# Patient Record
Sex: FEMALE | Race: WHITE | NOT HISPANIC OR LATINO | Employment: OTHER | URBAN - METROPOLITAN AREA
[De-identification: names, ages, dates, MRNs, and addresses within clinical notes are randomized per-mention and may not be internally consistent; named-entity substitution may affect disease eponyms.]

---

## 2021-06-10 ENCOUNTER — OFFICE VISIT (OUTPATIENT)
Dept: GYNECOLOGIC ONCOLOGY | Facility: CLINIC | Age: 61
End: 2021-06-10

## 2021-06-10 ENCOUNTER — PATIENT EDUCATION (SURGERY INSTRUCTIONS) (OUTPATIENT)
Dept: GYNECOLOGIC ONCOLOGY | Facility: CLINIC | Age: 61
End: 2021-06-10

## 2021-06-10 VITALS
DIASTOLIC BLOOD PRESSURE: 69 MMHG | HEART RATE: 95 BPM | BODY MASS INDEX: 50.02 KG/M2 | RESPIRATION RATE: 20 BRPM | HEIGHT: 64 IN | TEMPERATURE: 97.8 F | OXYGEN SATURATION: 95 % | SYSTOLIC BLOOD PRESSURE: 141 MMHG | WEIGHT: 293 LBS

## 2021-06-10 DIAGNOSIS — N85.02 COMPLEX ATYPICAL ENDOMETRIAL HYPERPLASIA: Primary | ICD-10-CM

## 2021-06-10 PROCEDURE — 99205 OFFICE O/P NEW HI 60 MIN: CPT | Performed by: OBSTETRICS & GYNECOLOGY

## 2021-06-10 RX ORDER — HYDROCHLOROTHIAZIDE 25 MG/1
25 TABLET ORAL DAILY
COMMUNITY

## 2021-06-10 RX ORDER — ACETAMINOPHEN 325 MG/1
975 TABLET ORAL ONCE
Status: CANCELLED | OUTPATIENT
Start: 2021-06-10 | End: 2021-06-10

## 2021-06-10 RX ORDER — HEPARIN SODIUM 5000 [USP'U]/ML
5000 INJECTION, SOLUTION INTRAVENOUS; SUBCUTANEOUS ONCE
Status: CANCELLED | OUTPATIENT
Start: 2021-06-10 | End: 2021-06-10

## 2021-06-10 RX ORDER — LEVOTHYROXINE SODIUM 175 UG/1
175 TABLET ORAL EVERY MORNING
COMMUNITY

## 2021-06-10 RX ORDER — IBUPROFEN 200 MG
400 TABLET ORAL EVERY 6 HOURS PRN
COMMUNITY

## 2021-06-10 RX ORDER — FEXOFENADINE HCL 180 MG/1
180 TABLET ORAL AS NEEDED
COMMUNITY

## 2021-06-10 NOTE — PATIENT INSTRUCTIONS
Laparoscopic Surgery Instructions            Cristino Trevino  [id number]  1960      SURGEON:  Ama Morris MD    Surgery Coordinator: Candelaria   If you have any questions before or after surgery please call.  [phone]       Appointment      2. You have pre-admission testing (PAT) appointment on 06/16/2021 at 01:45 PM You will need to be at hospital registration 10 minutes before that time. The registration department is located in the long hallway between the [facility] and [address] [place] [address]. This is on the first floor of the [facility] hospital you can enter through the [address] [place] [address].      3.  Your surgery has been scheduled on 06/18/2021.  You will need to be at the [address] [place] [address] second floor surgery registration on that day at 08:00 AM    The Day(s) Before Surgery     ·  Nothing by mouth after midnight on 06/17/2021    · Plan to have someone take you home from the hospital.    · Do not use any products that contain nicotine or tobacco, such as cigarettes and e-cigarettes. These can delay healing after surgery. If you need help quitting, ask your health care provider.    ·  Do not take vitamins or aspirin one week before surgery ( if applicable).  On the morning of your surgery, you may take you prescription medications with a sip of water, unless told otherwise by your provider.  Bring them with you to the hospital (Diabetic patient should bring insulin if instructed by the managing physician). If you are taking a blood thinner ( Eliquis, Coumadin, Xarelto, Lovenox, Asprin, Heparin, etc.) please have the provider that manages this instruct you on when to stop taking prior to surgery.     · If you are feeling sick, have a fever or cough and have seen your PCP let our office know 48 hours prior to surgery. It may be subject to rescheduling.            What can I expect after the procedure?    A normal length of stay for laparoscopic procedures can be same day or up to 23 hours.     After the procedure, it is  common to have:  · Pain.  · Soreness and numbness in your incision areas.  · Vaginal bleeding and discharge up to 6 weeks after surgery.  · Constipation.  · Temporary change in bladder function.  · Feelings of sadness or other emotions.  · Small amounts of clear drainage from incisions  · If you are discharged with an abdominal binder, this is to be used as needed for incisional comfort. You may choose to discontinue use at any time.      Follow these instructions at home:  Medicines    · Please take any medications that have been prescribed after your surgery, you may take over the counter Tylenol and Ibuprofen, unless told otherwise by your provider.   · Please take your stool softener as prescribed. If you do not have a bowel movement within 24 hours following surgery try a laxative (milk of magnesia) or you may take Laurle-Lax.    Activity    · Return to your normal activities as told by your health care provider.   · You may be told to take short walks every day and go farther each time.  · Do not lift anything that is heavier than 20 lbs.      General instructions    · Do not put anything in your vagina for 6 weeks after your surgery or as told by your health care provider. This includes tampons and douches.  · Do not have sex until your health care provider says you can.  · Do not take baths, swim, or use a hot tub until your health care provider approves.  · Drink enough fluid to keep your urine clear or pale yellow.  · Do not drive for 24 hours if you were given a sedative.  · Do not drive while taking Narcotic Pain medication ( oxycodone, hydrocodone, etc.).   · Keep all follow-up visits as told by your health care provider. This is important. You will have a post op appointment typically 3 weeks after surgery.  · Make sure you are urinating on a scheduled basis, for example every 2 hours. This will help retrain your bladder after surgery and prevent urinary tract infections.     Contact a health care  provider if:    · Your pain medicine is not helping.  · You have a fever over 101.0  · You have redness, swelling, or pain at your incision site.  · You continue to have difficulty urinating.  · You have not had a bowel movement 2-3 days after surgery, experience nausea and vomiting, are unable to pass gas.   · Large volumes of drainage or blood from incisions, requiring multiple guaze changes.     Get help right away if:    · You have severe abdominal or back pain that is not controlled with medication.  · You have heavy bleeding from your vagina that saturates a maxi pad within 1-2 hours. Note- vaginal bleeding and spotting is normal up to 6 weeks from a hysterectomy, Heavy Bleeding is not.     If you experience a medical emergency call 911 or have someone drive you to your nearest emergency department.

## 2021-06-10 NOTE — H&P (VIEW-ONLY)
New Patient Office Visit      Patient Name: Cristino Trevino  : 1960   MRN: [id number]     Referring Physician: Timo Whalen MD    Chief Complaint:    Chief Complaint   Patient presents with   • Appointment     Complex atypical hyperplasia       History of Present Illness: Cristino Trevino is a 61 y.o. female who is here today to as a consultation with gynecologic oncology. She started having vaginal bleeding in March. Bleeding was like a heavy period with passage of tissue. She saw Dr. Whalen who did a D&C and biopsy on Monday. This returned as complex atypia hyperplasia. She was referred here for management. She does have some light spotting since her D&C. Also occasionally has associated cramping. She presents to discuss management options.    Oncologic History:  Oncology/Hematology History   Complex atypical endometrial hyperplasia   6/10/2021 Initial Diagnosis    Referred by Dr. Timo Whalen    Presenting with several month history of postmenopausal bleeding    2021: D&C and hysteroscopy with pathology demonstrating at least CAH with a focus approaching grade 1 endometrial cancer          Past Medical History:   Past Medical History:   Diagnosis Date   • Chiari I malformation (CMS/HCC)    • Disease of thyroid gland    • Fatty liver disease, nonalcoholic    • Fibromyalgia    • Fluid retention    • Hashimoto's disease    • Intestinal malabsorption    • Lupus (CMS/HCC)    • Israel syndrome (CMS/HCC)    • Sjogren's disease (CMS/HCC)    • Thyroid cancer (CMS/HCC) 2006    Thyroidectomy & Radiation in [place]       Past Surgical History:   Past Surgical History:   Procedure Laterality Date   • COLONOSCOPY     • RETINAL DETACHMENT REPAIR     • SECONDARY INTRAOCULAR LENSE IMPLANTATION     • THYROIDECTOMY  2006   • WISDOM TOOTH EXTRACTION      age 18       Family History:   Family History   Problem Relation Age of Onset   • Heart disease Father    • Hypertension Mother    • Breast cancer  Maternal Aunt    • Thyroid cancer Paternal Aunt        Social History:   Social History     Socioeconomic History   • Marital status: Single     Spouse name: Not on file   • Number of children: Not on file   • Years of education: Not on file   • Highest education level: Not on file   Tobacco Use   • Smoking status: Never Smoker   • Smokeless tobacco: Never Used   Vaping Use   • Vaping Use: Never used   Substance and Sexual Activity   • Alcohol use: Yes     Comment: Rarely   • Drug use: Not Currently     Types: Marijuana     Comment: Medical Marijuana prn   • Sexual activity: Not Currently       Past OB/GYN History:   OB History   No obstetric history on file.   G0  Menopause at 49. She did do the estrogen patch right after menopause for 3-4 years.  Denies any abnormal pap tests.     Health Maintenance:   Mammogram: Date: 2019 Results: normal  Colonoscopy: Date: 5 years Results: normal     Medications:     Current Outpatient Medications:   •  fexofenadine (ALLEGRA) 180 MG tablet, Take 180 mg by mouth Daily., Disp: , Rfl:   •  hydroCHLOROthiazide (HYDRODIURIL) 25 MG tablet, Take 25 mg by mouth Daily., Disp: , Rfl:   •  ibuprofen (ADVIL,MOTRIN) 200 MG tablet, Take 200 mg by mouth Every 6 (Six) Hours As Needed for Mild Pain ., Disp: , Rfl:   •  levothyroxine (SYNTHROID, LEVOTHROID) 175 MCG tablet, Take 175 mcg by mouth Daily., Disp: , Rfl:     Allergies:   No Known Allergies    Review of Systems:   Review of Systems   Constitutional: Positive for fatigue. Negative for activity change, appetite change, chills and unexpected weight change.   Eyes: Positive for itching and visual disturbance.   Respiratory: Positive for shortness of breath (Occasionally). Negative for cough and wheezing.    Cardiovascular: Negative for chest pain, palpitations and leg swelling.   Gastrointestinal: Negative for abdominal distention, abdominal pain, constipation, diarrhea, nausea and vomiting.   Genitourinary: Positive for pelvic pain and  "vaginal bleeding. Negative for dyspareunia, dysuria, flank pain, frequency and urgency.   Musculoskeletal: Positive for arthralgias, back pain, joint swelling and myalgias.   Skin: Negative for color change, pallor, rash and wound.   Neurological: Positive for weakness and numbness. Negative for dizziness, light-headedness and headaches.   Hematological: Negative for adenopathy. Does not bruise/bleed easily.   Psychiatric/Behavioral: Negative for dysphoric mood. The patient is not nervous/anxious.         Objective     Physical Exam:  Vital Signs:   Vitals:    06/10/21 1420   BP: 141/69  Comment: Left Wrist   Pulse: 95   Resp: 20   Temp: 97.8 °F (36.6 °C)   TempSrc: Infrared   SpO2: 95%  Comment: RA   Weight: (!) 138 kg (305 lb)   Height: 162.6 cm (64\")   PainSc: 0-No pain     BMI: Body mass index is 52.35 kg/m².   ECOG score: 2           PHQ-2 Depression Screening  Little interest or pleasure in doing things? 0   Feeling down, depressed, or hopeless? 0   PHQ-2 Total Score 0     Physical Exam  Vitals and nursing note reviewed. Exam conducted with a chaperone present.   Constitutional:       General: She is not in acute distress.     Appearance: Normal appearance. She is well-developed. She is obese. She is not diaphoretic.   HENT:      Head: Normocephalic and atraumatic.      Right Ear: External ear normal.      Left Ear: External ear normal.      Nose: Nose normal.   Eyes:      General: No scleral icterus.        Right eye: No discharge.         Left eye: No discharge.      Conjunctiva/sclera: Conjunctivae normal.   Neck:      Thyroid: No thyromegaly.   Cardiovascular:      Rate and Rhythm: Normal rate and regular rhythm.      Heart sounds: No murmur heard.     Pulmonary:      Effort: Pulmonary effort is normal. No respiratory distress.      Breath sounds: Normal breath sounds. No wheezing.   Abdominal:      General: Bowel sounds are normal. There is no distension.      Palpations: Abdomen is soft. There is no " mass.      Tenderness: There is no abdominal tenderness. There is no guarding.      Hernia: No hernia is present.   Genitourinary:     Comments: External genitalia normal. Vagina without discharge. Cervix normal. Uterus normal and mobile. No adnexal masses. Exam limited by habitus. Rectovaginal exam deferred.  Musculoskeletal:         General: No swelling, tenderness, deformity or signs of injury.      Cervical back: Normal range of motion and neck supple.      Right lower leg: No edema.      Left lower leg: No edema.   Lymphadenopathy:      Cervical: No cervical adenopathy.   Skin:     General: Skin is warm and dry.      Findings: No erythema, lesion or rash.   Neurological:      General: No focal deficit present.      Mental Status: She is alert and oriented to person, place, and time. Mental status is at baseline.   Psychiatric:         Behavior: Behavior normal.         Thought Content: Thought content normal.         Assessment / Plan    Cristino Trevino is a 61 y.o. year old female recently diagnosed with complex atypical hyperplasia of the endometrium. I have counseled her that the risk of concomitant endometrial cancer exceeds 40% and that surgery is the cornerstone of management. I have recommended that the patient undergo a robotic assisted total laparoscopic hysterectomy with bilateral salpingo-oophorectomy. Given the increased risk of concurrent cancer, I recommended lymph node assessment via the sentinel node approach +/- pelvic/para-aortic lymph node dissection pending operative findings.  We discussed the importance of awaiting final pathology and also discussed the nature of endometrial cancer. She is aware that the majority of endometrial cancer cases are found in the early stages; however, if more advanced disease is encountered, she may require chemotherapy, radiation or a combination of the two. She was also informed of the risks of the procedure including bleeding, infection, wound breakdown, blood  clots, injury to surrounding organs requiring additional intervention, possible need for another procedure, and the need for a laparotomy if the surgery cannot be performed safely via the minimally invasive approach. We also discussed the anticipated hospital stay and recovery time. After all questions were answered, she did sign the informed consent and will be scheduled for surgery in the near future.      Morbid obesity (current Body mass index is 52.35 kg/m².): I counseled her that excess body weight is associated with the development of many health issues (including cancer and heart disease) and increases the risk of surgical complications. She was reminded the importance of a healthy body weight. Diet and exercise guidelines ([employer]) were reviewed.     Pain assessment was performed today as a part of patient’s care.  For patients with pain related to surgery, gynecologic malignancy or cancer treatment, the plan is as noted in the assessment/plan.  For patients with pain not related to these issues, they are to seek any further needed care from a more appropriate provider, such as PCP.      Diagnoses and all orders for this visit:    1. Complex atypical endometrial hyperplasia (Primary)  -     CBC & Differential; Future  -     Comprehensive Metabolic Panel; Future  -     XR Chest 2 View; Future  -     Case Request; Standing  -     ECG 12 Lead; Future  -     Type & Screen; Future  -     Case Request    Other orders  -     Verify NPO Status; Future  -     Obtain Informed Consent; Future  -     Provide Instructions to Patient on NPO Status; Future  -     Chlorhexidine Skin Prep; Future  -     Instruct Patient on Coughing, Deep Breathing & Incentive Spirometry; Future  -     Verify NPO Status; Standing  -     Obtain Informed Consent If Not Already Obtained; Standing  -     Notify Physician - Standard; Standing  -     Initiate Observation Status; Standing  -     acetaminophen (TYLENOL) tablet 975  mg  -     heparin (porcine) 5000 UNIT/ML injection 5,000 Units  -     ceFAZolin (ANCEF) 2 g in sodium chloride 0.9 % 100 mL IVPB         Follow Up: Surgery.    RONY Morris MD  Gynecologic Oncology     Please note that portions of this note may have been completed with a voice recognition program.

## 2021-06-10 NOTE — PROGRESS NOTES
New Patient Office Visit      Patient Name: Cristino Trevino  : 1960   MRN: [id number]     Referring Physician: Timo Whalen MD    Chief Complaint:    Chief Complaint   Patient presents with   • Appointment     Complex atypical hyperplasia       History of Present Illness: Cristino Trevino is a 61 y.o. female who is here today to as a consultation with gynecologic oncology. She started having vaginal bleeding in March. Bleeding was like a heavy period with passage of tissue. She saw Dr. Whalen who did a D&C and biopsy on Monday. This returned as complex atypia hyperplasia. She was referred here for management. She does have some light spotting since her D&C. Also occasionally has associated cramping. She presents to discuss management options.    Oncologic History:  Oncology/Hematology History   Complex atypical endometrial hyperplasia   6/10/2021 Initial Diagnosis    Referred by Dr. Timo Whalen    Presenting with several month history of postmenopausal bleeding    2021: D&C and hysteroscopy with pathology demonstrating at least CAH with a focus approaching grade 1 endometrial cancer          Past Medical History:   Past Medical History:   Diagnosis Date   • Chiari I malformation (CMS/HCC)    • Disease of thyroid gland    • Fatty liver disease, nonalcoholic    • Fibromyalgia    • Fluid retention    • Hashimoto's disease    • Intestinal malabsorption    • Lupus (CMS/HCC)    • Israel syndrome (CMS/HCC)    • Sjogren's disease (CMS/HCC)    • Thyroid cancer (CMS/HCC) 2006    Thyroidectomy & Radiation in [place]       Past Surgical History:   Past Surgical History:   Procedure Laterality Date   • COLONOSCOPY     • RETINAL DETACHMENT REPAIR     • SECONDARY INTRAOCULAR LENSE IMPLANTATION     • THYROIDECTOMY  2006   • WISDOM TOOTH EXTRACTION      age 18       Family History:   Family History   Problem Relation Age of Onset   • Heart disease Father    • Hypertension Mother    • Breast cancer  Maternal Aunt    • Thyroid cancer Paternal Aunt        Social History:   Social History     Socioeconomic History   • Marital status: Single     Spouse name: Not on file   • Number of children: Not on file   • Years of education: Not on file   • Highest education level: Not on file   Tobacco Use   • Smoking status: Never Smoker   • Smokeless tobacco: Never Used   Vaping Use   • Vaping Use: Never used   Substance and Sexual Activity   • Alcohol use: Yes     Comment: Rarely   • Drug use: Not Currently     Types: Marijuana     Comment: Medical Marijuana prn   • Sexual activity: Not Currently       Past OB/GYN History:   OB History   No obstetric history on file.   G0  Menopause at 49. She did do the estrogen patch right after menopause for 3-4 years.  Denies any abnormal pap tests.     Health Maintenance:   Mammogram: Date: 2019 Results: normal  Colonoscopy: Date: 5 years Results: normal     Medications:     Current Outpatient Medications:   •  fexofenadine (ALLEGRA) 180 MG tablet, Take 180 mg by mouth Daily., Disp: , Rfl:   •  hydroCHLOROthiazide (HYDRODIURIL) 25 MG tablet, Take 25 mg by mouth Daily., Disp: , Rfl:   •  ibuprofen (ADVIL,MOTRIN) 200 MG tablet, Take 200 mg by mouth Every 6 (Six) Hours As Needed for Mild Pain ., Disp: , Rfl:   •  levothyroxine (SYNTHROID, LEVOTHROID) 175 MCG tablet, Take 175 mcg by mouth Daily., Disp: , Rfl:     Allergies:   No Known Allergies    Review of Systems:   Review of Systems   Constitutional: Positive for fatigue. Negative for activity change, appetite change, chills and unexpected weight change.   Eyes: Positive for itching and visual disturbance.   Respiratory: Positive for shortness of breath (Occasionally). Negative for cough and wheezing.    Cardiovascular: Negative for chest pain, palpitations and leg swelling.   Gastrointestinal: Negative for abdominal distention, abdominal pain, constipation, diarrhea, nausea and vomiting.   Genitourinary: Positive for pelvic pain and  "vaginal bleeding. Negative for dyspareunia, dysuria, flank pain, frequency and urgency.   Musculoskeletal: Positive for arthralgias, back pain, joint swelling and myalgias.   Skin: Negative for color change, pallor, rash and wound.   Neurological: Positive for weakness and numbness. Negative for dizziness, light-headedness and headaches.   Hematological: Negative for adenopathy. Does not bruise/bleed easily.   Psychiatric/Behavioral: Negative for dysphoric mood. The patient is not nervous/anxious.         Objective     Physical Exam:  Vital Signs:   Vitals:    06/10/21 1420   BP: 141/69  Comment: Left Wrist   Pulse: 95   Resp: 20   Temp: 97.8 °F (36.6 °C)   TempSrc: Infrared   SpO2: 95%  Comment: RA   Weight: (!) 138 kg (305 lb)   Height: 162.6 cm (64\")   PainSc: 0-No pain     BMI: Body mass index is 52.35 kg/m².   ECOG score: 2           PHQ-2 Depression Screening  Little interest or pleasure in doing things? 0   Feeling down, depressed, or hopeless? 0   PHQ-2 Total Score 0     Physical Exam  Vitals and nursing note reviewed. Exam conducted with a chaperone present.   Constitutional:       General: She is not in acute distress.     Appearance: Normal appearance. She is well-developed. She is obese. She is not diaphoretic.   HENT:      Head: Normocephalic and atraumatic.      Right Ear: External ear normal.      Left Ear: External ear normal.      Nose: Nose normal.   Eyes:      General: No scleral icterus.        Right eye: No discharge.         Left eye: No discharge.      Conjunctiva/sclera: Conjunctivae normal.   Neck:      Thyroid: No thyromegaly.   Cardiovascular:      Rate and Rhythm: Normal rate and regular rhythm.      Heart sounds: No murmur heard.     Pulmonary:      Effort: Pulmonary effort is normal. No respiratory distress.      Breath sounds: Normal breath sounds. No wheezing.   Abdominal:      General: Bowel sounds are normal. There is no distension.      Palpations: Abdomen is soft. There is no " mass.      Tenderness: There is no abdominal tenderness. There is no guarding.      Hernia: No hernia is present.   Genitourinary:     Comments: External genitalia normal. Vagina without discharge. Cervix normal. Uterus normal and mobile. No adnexal masses. Exam limited by habitus. Rectovaginal exam deferred.  Musculoskeletal:         General: No swelling, tenderness, deformity or signs of injury.      Cervical back: Normal range of motion and neck supple.      Right lower leg: No edema.      Left lower leg: No edema.   Lymphadenopathy:      Cervical: No cervical adenopathy.   Skin:     General: Skin is warm and dry.      Findings: No erythema, lesion or rash.   Neurological:      General: No focal deficit present.      Mental Status: She is alert and oriented to person, place, and time. Mental status is at baseline.   Psychiatric:         Behavior: Behavior normal.         Thought Content: Thought content normal.         Assessment / Plan    Cristino Trevino is a 61 y.o. year old female recently diagnosed with complex atypical hyperplasia of the endometrium. I have counseled her that the risk of concomitant endometrial cancer exceeds 40% and that surgery is the cornerstone of management. I have recommended that the patient undergo a robotic assisted total laparoscopic hysterectomy with bilateral salpingo-oophorectomy. Given the increased risk of concurrent cancer, I recommended lymph node assessment via the sentinel node approach +/- pelvic/para-aortic lymph node dissection pending operative findings.  We discussed the importance of awaiting final pathology and also discussed the nature of endometrial cancer. She is aware that the majority of endometrial cancer cases are found in the early stages; however, if more advanced disease is encountered, she may require chemotherapy, radiation or a combination of the two. She was also informed of the risks of the procedure including bleeding, infection, wound breakdown, blood  clots, injury to surrounding organs requiring additional intervention, possible need for another procedure, and the need for a laparotomy if the surgery cannot be performed safely via the minimally invasive approach. We also discussed the anticipated hospital stay and recovery time. After all questions were answered, she did sign the informed consent and will be scheduled for surgery in the near future.      Morbid obesity (current Body mass index is 52.35 kg/m².): I counseled her that excess body weight is associated with the development of many health issues (including cancer and heart disease) and increases the risk of surgical complications. She was reminded the importance of a healthy body weight. Diet and exercise guidelines (American [employer] Society) were reviewed.     Pain assessment was performed today as a part of patient’s care.  For patients with pain related to surgery, gynecologic malignancy or cancer treatment, the plan is as noted in the assessment/plan.  For patients with pain not related to these issues, they are to seek any further needed care from a more appropriate provider, such as PCP.      Diagnoses and all orders for this visit:    1. Complex atypical endometrial hyperplasia (Primary)  -     CBC & Differential; Future  -     Comprehensive Metabolic Panel; Future  -     XR Chest 2 View; Future  -     Case Request; Standing  -     ECG 12 Lead; Future  -     Type & Screen; Future  -     Case Request    Other orders  -     Verify NPO Status; Future  -     Obtain Informed Consent; Future  -     Provide Instructions to Patient on NPO Status; Future  -     Chlorhexidine Skin Prep; Future  -     Instruct Patient on Coughing, Deep Breathing & Incentive Spirometry; Future  -     Verify NPO Status; Standing  -     Obtain Informed Consent If Not Already Obtained; Standing  -     Notify Physician - Standard; Standing  -     Initiate Observation Status; Standing  -     acetaminophen (TYLENOL) tablet 975  mg  -     heparin (porcine) 5000 UNIT/ML injection 5,000 Units  -     ceFAZolin (ANCEF) 2 g in sodium chloride 0.9 % 100 mL IVPB         Follow Up: Surgery.    RONY Morris MD  Gynecologic Oncology     Please note that portions of this note may have been completed with a voice recognition program.

## 2021-06-16 ENCOUNTER — PRE-ADMISSION TESTING (OUTPATIENT)
Dept: PREADMISSION TESTING | Facility: HOSPITAL | Age: 61
End: 2021-06-16

## 2021-06-16 ENCOUNTER — ANESTHESIA EVENT (OUTPATIENT)
Dept: PERIOP | Facility: HOSPITAL | Age: 61
End: 2021-06-16

## 2021-06-16 ENCOUNTER — HOSPITAL ENCOUNTER (OUTPATIENT)
Dept: GENERAL RADIOLOGY | Facility: HOSPITAL | Age: 61
Discharge: HOME | End: 2021-06-16

## 2021-06-16 VITALS — HEIGHT: 64 IN | WEIGHT: 293 LBS | BODY MASS INDEX: 50.02 KG/M2

## 2021-06-16 DIAGNOSIS — N85.02 COMPLEX ATYPICAL ENDOMETRIAL HYPERPLASIA: ICD-10-CM

## 2021-06-16 LAB
ABO GROUP BLD: NORMAL
ALBUMIN SERPL-MCNC: 4.1 G/DL (ref 3.5–5.2)
ALBUMIN/GLOB SERPL: 1.5 G/DL
ALP SERPL-CCNC: 91 U/L (ref 39–117)
ALT SERPL W P-5'-P-CCNC: 112 U/L (ref 1–33)
ANION GAP SERPL CALCULATED.3IONS-SCNC: 11 MMOL/L (ref 5–15)
AST SERPL-CCNC: 102 U/L (ref 1–32)
BASOPHILS # BLD AUTO: 0.06 10*3/MM3 (ref 0–0.2)
BASOPHILS NFR BLD AUTO: 0.5 % (ref 0–1.5)
BILIRUB SERPL-MCNC: 1.1 MG/DL (ref 0–1.2)
BLD GP AB SCN SERPL QL: NEGATIVE
BUN SERPL-MCNC: 16 MG/DL (ref 8–23)
BUN/CREAT SERPL: 25 (ref 7–25)
CALCIUM SPEC-SCNC: 9.5 MG/DL (ref 8.6–10.5)
CHLORIDE SERPL-SCNC: 100 MMOL/L (ref 98–107)
CO2 SERPL-SCNC: 27 MMOL/L (ref 22–29)
CREAT SERPL-MCNC: 0.64 MG/DL (ref 0.57–1)
DEPRECATED RDW RBC AUTO: 47 FL (ref 37–54)
EOSINOPHIL # BLD AUTO: 0.38 10*3/MM3 (ref 0–0.4)
EOSINOPHIL NFR BLD AUTO: 3.1 % (ref 0.3–6.2)
ERYTHROCYTE [DISTWIDTH] IN BLOOD BY AUTOMATED COUNT: 14.8 % (ref 12.3–15.4)
GFR SERPL CREATININE-BSD FRML MDRD: 94 ML/MIN/1.73
GLOBULIN UR ELPH-MCNC: 2.7 GM/DL
GLUCOSE SERPL-MCNC: 177 MG/DL (ref 65–99)
HCT VFR BLD AUTO: 45.9 % (ref 34–46.6)
HGB BLD-MCNC: 13.6 G/DL (ref 12–15.9)
IMM GRANULOCYTES # BLD AUTO: 0.05 10*3/MM3 (ref 0–0.05)
IMM GRANULOCYTES NFR BLD AUTO: 0.4 % (ref 0–0.5)
LYMPHOCYTES # BLD AUTO: 2.5 10*3/MM3 (ref 0.7–3.1)
LYMPHOCYTES NFR BLD AUTO: 20.3 % (ref 19.6–45.3)
MCH RBC QN AUTO: 25.7 PG (ref 26.6–33)
MCHC RBC AUTO-ENTMCNC: 29.6 G/DL (ref 31.5–35.7)
MCV RBC AUTO: 86.6 FL (ref 79–97)
MONOCYTES # BLD AUTO: 0.74 10*3/MM3 (ref 0.1–0.9)
MONOCYTES NFR BLD AUTO: 6 % (ref 5–12)
NEUTROPHILS NFR BLD AUTO: 69.7 % (ref 42.7–76)
NEUTROPHILS NFR BLD AUTO: 8.61 10*3/MM3 (ref 1.7–7)
NRBC BLD AUTO-RTO: 0 /100 WBC (ref 0–0.2)
PLATELET # BLD AUTO: 297 10*3/MM3 (ref 140–450)
PMV BLD AUTO: 10.7 FL (ref 6–12)
POTASSIUM SERPL-SCNC: 3.8 MMOL/L (ref 3.5–5.2)
PROT SERPL-MCNC: 6.8 G/DL (ref 6–8.5)
QT INTERVAL: 386 MS
QTC INTERVAL: 469 MS
RBC # BLD AUTO: 5.3 10*6/MM3 (ref 3.77–5.28)
RH BLD: POSITIVE
SARS-COV-2 RNA PNL SPEC NAA+PROBE: NOT DETECTED
SODIUM SERPL-SCNC: 138 MMOL/L (ref 136–145)
T&S EXPIRATION DATE: NORMAL
WBC # BLD AUTO: 12.34 10*3/MM3 (ref 3.4–10.8)

## 2021-06-16 PROCEDURE — 93010 ELECTROCARDIOGRAM REPORT: CPT | Performed by: INTERNAL MEDICINE

## 2021-06-16 PROCEDURE — 86900 BLOOD TYPING SEROLOGIC ABO: CPT

## 2021-06-16 PROCEDURE — 86901 BLOOD TYPING SEROLOGIC RH(D): CPT

## 2021-06-16 PROCEDURE — 36415 COLL VENOUS BLD VENIPUNCTURE: CPT

## 2021-06-16 PROCEDURE — U0004 COV-19 TEST NON-CDC HGH THRU: HCPCS

## 2021-06-16 PROCEDURE — 86850 RBC ANTIBODY SCREEN: CPT

## 2021-06-16 PROCEDURE — 71046 X-RAY EXAM CHEST 2 VIEWS: CPT

## 2021-06-16 PROCEDURE — C9803 HOPD COVID-19 SPEC COLLECT: HCPCS

## 2021-06-16 PROCEDURE — 85025 COMPLETE CBC W/AUTO DIFF WBC: CPT

## 2021-06-16 PROCEDURE — 93005 ELECTROCARDIOGRAM TRACING: CPT

## 2021-06-16 PROCEDURE — 80053 COMPREHEN METABOLIC PANEL: CPT

## 2021-06-16 NOTE — PAT
Patient to apply Chlorhexadine wipes  to surgical area (as instructed) the night before procedure and the AM of procedure. Wipes provided.    An arrival time for procedure was not given during PAT visit. If patient had any questions or concerns about their arrival time, they were instructed to contact their surgeon/physician.  Additionally, if the patient referred to an arrival time that was acquired from their my chart account, patient was encouraged to verify that time with their surgeon/physician.  NO arrival times given in Pre Admission Testing Department.    Patient directed to Radiology Department for CXR after Pre Admission Testing Appointment.     covid in pat     EKG faxed to anesthesia.  EKG reviewed by Dr Wu.  No further testing needed at this time.

## 2021-06-17 ENCOUNTER — TELEPHONE (OUTPATIENT)
Dept: GYNECOLOGIC ONCOLOGY | Facility: CLINIC | Age: 61
End: 2021-06-17

## 2021-06-17 NOTE — TELEPHONE ENCOUNTER
Caller: [employer]    Relationship: PATIENT    Best call back number: [phone]     What is the best time to reach you: ASAP    Who are you requesting to speak with (clinical staff, provider,  specific staff member): NUVIA    What was the call regarding: SHE JUST GOT A CALL FROM THE HOSPITAL STATING HER INSURANCE HAS NOT AUTHORIZED HER SURGERY FOR TOMORROW MORNING YET.     Do you require a callback: YES

## 2021-06-18 ENCOUNTER — HOSPITAL ENCOUNTER (OUTPATIENT)
Facility: HOSPITAL | Age: 61
Setting detail: HOSPITAL OUTPATIENT SURGERY
Discharge: HOME | End: 2021-06-18
Attending: OBSTETRICS & GYNECOLOGY | Admitting: OBSTETRICS & GYNECOLOGY

## 2021-06-18 ENCOUNTER — ANESTHESIA (OUTPATIENT)
Dept: PERIOP | Facility: HOSPITAL | Age: 61
End: 2021-06-18

## 2021-06-18 VITALS
WEIGHT: 293 LBS | HEIGHT: 66 IN | RESPIRATION RATE: 20 BRPM | TEMPERATURE: 97.9 F | DIASTOLIC BLOOD PRESSURE: 97 MMHG | HEART RATE: 70 BPM | BODY MASS INDEX: 47.09 KG/M2 | OXYGEN SATURATION: 93 % | SYSTOLIC BLOOD PRESSURE: 167 MMHG

## 2021-06-18 DIAGNOSIS — N85.02 COMPLEX ATYPICAL ENDOMETRIAL HYPERPLASIA: ICD-10-CM

## 2021-06-18 LAB
ABO GROUP BLD: NORMAL
RH BLD: POSITIVE

## 2021-06-18 PROCEDURE — 58571 TLH W/T/O 250 G OR LESS: CPT | Performed by: PHYSICIAN ASSISTANT

## 2021-06-18 PROCEDURE — 38571 LAPAROSCOPY LYMPHADENECTOMY: CPT | Performed by: PHYSICIAN ASSISTANT

## 2021-06-18 PROCEDURE — 25010000002 HEPARIN (PORCINE) PER 1000 UNITS: Performed by: OBSTETRICS & GYNECOLOGY

## 2021-06-18 PROCEDURE — 25010000002 FENTANYL CITRATE (PF) 50 MCG/ML SOLUTION: Performed by: ANESTHESIOLOGY

## 2021-06-18 PROCEDURE — 58571 TLH W/T/O 250 G OR LESS: CPT | Performed by: OBSTETRICS & GYNECOLOGY

## 2021-06-18 PROCEDURE — 25010000002 PROPOFOL 10 MG/ML EMULSION: Performed by: NURSE ANESTHETIST, CERTIFIED REGISTERED

## 2021-06-18 PROCEDURE — 88307 TISSUE EXAM BY PATHOLOGIST: CPT | Performed by: OBSTETRICS & GYNECOLOGY

## 2021-06-18 PROCEDURE — 25010000002 FENTANYL CITRATE (PF) 50 MCG/ML SOLUTION: Performed by: NURSE ANESTHETIST, CERTIFIED REGISTERED

## 2021-06-18 PROCEDURE — 38571 LAPAROSCOPY LYMPHADENECTOMY: CPT | Performed by: OBSTETRICS & GYNECOLOGY

## 2021-06-18 PROCEDURE — 25010000003 CEFAZOLIN IN DEXTROSE 2-4 GM/100ML-% SOLUTION: Performed by: OBSTETRICS & GYNECOLOGY

## 2021-06-18 PROCEDURE — 86900 BLOOD TYPING SEROLOGIC ABO: CPT

## 2021-06-18 PROCEDURE — G0378 HOSPITAL OBSERVATION PER HR: HCPCS

## 2021-06-18 PROCEDURE — 25010000002 DEXAMETHASONE PER 1 MG: Performed by: NURSE ANESTHETIST, CERTIFIED REGISTERED

## 2021-06-18 PROCEDURE — 25010000002 HYDROMORPHONE PER 4 MG: Performed by: ANESTHESIOLOGY

## 2021-06-18 PROCEDURE — 38900 IO MAP OF SENT LYMPH NODE: CPT | Performed by: OBSTETRICS & GYNECOLOGY

## 2021-06-18 PROCEDURE — 25010000002 NEOSTIGMINE 10 MG/10ML SOLUTION: Performed by: NURSE ANESTHETIST, CERTIFIED REGISTERED

## 2021-06-18 PROCEDURE — 25010000002 FENTANYL CITRATE (PF) 100 MCG/2ML SOLUTION: Performed by: ANESTHESIOLOGY

## 2021-06-18 PROCEDURE — 86901 BLOOD TYPING SEROLOGIC RH(D): CPT

## 2021-06-18 PROCEDURE — 25010000002 ONDANSETRON PER 1 MG: Performed by: NURSE ANESTHETIST, CERTIFIED REGISTERED

## 2021-06-18 RX ORDER — SODIUM CHLORIDE 9 MG/ML
INJECTION, SOLUTION INTRAVENOUS AS NEEDED
Status: DISCONTINUED | OUTPATIENT
Start: 2021-06-18 | End: 2021-06-18 | Stop reason: HOSPADM

## 2021-06-18 RX ORDER — FAMOTIDINE 20 MG/1
20 TABLET, FILM COATED ORAL
Status: COMPLETED | OUTPATIENT
Start: 2021-06-18 | End: 2021-06-18

## 2021-06-18 RX ORDER — HEPARIN SODIUM 5000 [USP'U]/ML
5000 INJECTION, SOLUTION INTRAVENOUS; SUBCUTANEOUS ONCE
Status: COMPLETED | OUTPATIENT
Start: 2021-06-18 | End: 2021-06-18

## 2021-06-18 RX ORDER — MIDAZOLAM HYDROCHLORIDE 1 MG/ML
1 INJECTION INTRAMUSCULAR; INTRAVENOUS
Status: DISCONTINUED | OUTPATIENT
Start: 2021-06-18 | End: 2021-06-18 | Stop reason: HOSPADM

## 2021-06-18 RX ORDER — FENTANYL CITRATE 50 UG/ML
50 INJECTION, SOLUTION INTRAMUSCULAR; INTRAVENOUS
Status: DISCONTINUED | OUTPATIENT
Start: 2021-06-18 | End: 2021-06-18 | Stop reason: HOSPADM

## 2021-06-18 RX ORDER — DOCUSATE SODIUM 100 MG/1
100 CAPSULE, LIQUID FILLED ORAL 2 TIMES DAILY
Qty: 60 CAPSULE | Refills: 0 | Status: SHIPPED | OUTPATIENT
Start: 2021-06-18 | End: 2021-07-08

## 2021-06-18 RX ORDER — ROCURONIUM BROMIDE 10 MG/ML
INJECTION, SOLUTION INTRAVENOUS AS NEEDED
Status: DISCONTINUED | OUTPATIENT
Start: 2021-06-18 | End: 2021-06-18 | Stop reason: SURG

## 2021-06-18 RX ORDER — ACETAMINOPHEN 325 MG/1
650 TABLET ORAL EVERY 6 HOURS PRN
Qty: 60 TABLET | Refills: 0 | Status: SHIPPED | OUTPATIENT
Start: 2021-06-18

## 2021-06-18 RX ORDER — ACETAMINOPHEN 325 MG/1
975 TABLET ORAL ONCE
Status: COMPLETED | OUTPATIENT
Start: 2021-06-18 | End: 2021-06-18

## 2021-06-18 RX ORDER — FENTANYL CITRATE 50 UG/ML
INJECTION, SOLUTION INTRAMUSCULAR; INTRAVENOUS AS NEEDED
Status: DISCONTINUED | OUTPATIENT
Start: 2021-06-18 | End: 2021-06-18 | Stop reason: SURG

## 2021-06-18 RX ORDER — HYDROMORPHONE HYDROCHLORIDE 1 MG/ML
0.5 INJECTION, SOLUTION INTRAMUSCULAR; INTRAVENOUS; SUBCUTANEOUS
Status: DISCONTINUED | OUTPATIENT
Start: 2021-06-18 | End: 2021-06-18 | Stop reason: HOSPADM

## 2021-06-18 RX ORDER — FENTANYL CITRATE 50 UG/ML
25 INJECTION, SOLUTION INTRAMUSCULAR; INTRAVENOUS ONCE
Status: COMPLETED | OUTPATIENT
Start: 2021-06-18 | End: 2021-06-18

## 2021-06-18 RX ORDER — MAGNESIUM HYDROXIDE 1200 MG/15ML
LIQUID ORAL AS NEEDED
Status: DISCONTINUED | OUTPATIENT
Start: 2021-06-18 | End: 2021-06-18 | Stop reason: HOSPADM

## 2021-06-18 RX ORDER — LIDOCAINE HYDROCHLORIDE 10 MG/ML
INJECTION, SOLUTION EPIDURAL; INFILTRATION; INTRACAUDAL; PERINEURAL AS NEEDED
Status: DISCONTINUED | OUTPATIENT
Start: 2021-06-18 | End: 2021-06-18 | Stop reason: SURG

## 2021-06-18 RX ORDER — OXYCODONE HYDROCHLORIDE 5 MG/1
5 TABLET ORAL EVERY 4 HOURS PRN
Qty: 10 TABLET | Refills: 0 | Status: SHIPPED | OUTPATIENT
Start: 2021-06-18 | End: 2021-07-08

## 2021-06-18 RX ORDER — SODIUM CHLORIDE, SODIUM LACTATE, POTASSIUM CHLORIDE, CALCIUM CHLORIDE 600; 310; 30; 20 MG/100ML; MG/100ML; MG/100ML; MG/100ML
9 INJECTION, SOLUTION INTRAVENOUS CONTINUOUS PRN
Status: DISCONTINUED | OUTPATIENT
Start: 2021-06-18 | End: 2021-06-18 | Stop reason: HOSPADM

## 2021-06-18 RX ORDER — SODIUM CHLORIDE 0.9 % (FLUSH) 0.9 %
10 SYRINGE (ML) INJECTION AS NEEDED
Status: CANCELLED | OUTPATIENT
Start: 2021-06-18

## 2021-06-18 RX ORDER — GLYCOPYRROLATE 0.2 MG/ML
INJECTION INTRAMUSCULAR; INTRAVENOUS AS NEEDED
Status: DISCONTINUED | OUTPATIENT
Start: 2021-06-18 | End: 2021-06-18 | Stop reason: SURG

## 2021-06-18 RX ORDER — CEFAZOLIN SODIUM 2 G/100ML
2 INJECTION, SOLUTION INTRAVENOUS ONCE
Status: COMPLETED | OUTPATIENT
Start: 2021-06-18 | End: 2021-06-18

## 2021-06-18 RX ORDER — ESMOLOL HYDROCHLORIDE 10 MG/ML
INJECTION INTRAVENOUS AS NEEDED
Status: DISCONTINUED | OUTPATIENT
Start: 2021-06-18 | End: 2021-06-18 | Stop reason: SURG

## 2021-06-18 RX ORDER — PROPOFOL 10 MG/ML
VIAL (ML) INTRAVENOUS AS NEEDED
Status: DISCONTINUED | OUTPATIENT
Start: 2021-06-18 | End: 2021-06-18 | Stop reason: SURG

## 2021-06-18 RX ORDER — BUPIVACAINE HYDROCHLORIDE AND EPINEPHRINE 5; 5 MG/ML; UG/ML
INJECTION, SOLUTION PERINEURAL AS NEEDED
Status: DISCONTINUED | OUTPATIENT
Start: 2021-06-18 | End: 2021-06-18 | Stop reason: HOSPADM

## 2021-06-18 RX ORDER — OXYCODONE HYDROCHLORIDE 5 MG/1
5 TABLET ORAL ONCE AS NEEDED
Status: COMPLETED | OUTPATIENT
Start: 2021-06-18 | End: 2021-06-18

## 2021-06-18 RX ORDER — INDOCYANINE GREEN AND WATER 25 MG
KIT INJECTION AS NEEDED
Status: DISCONTINUED | OUTPATIENT
Start: 2021-06-18 | End: 2021-06-18 | Stop reason: HOSPADM

## 2021-06-18 RX ORDER — SODIUM CHLORIDE 0.9 % (FLUSH) 0.9 %
10 SYRINGE (ML) INJECTION EVERY 12 HOURS SCHEDULED
Status: CANCELLED | OUTPATIENT
Start: 2021-06-18

## 2021-06-18 RX ORDER — ALBUTEROL SULFATE 90 UG/1
AEROSOL, METERED RESPIRATORY (INHALATION) AS NEEDED
Status: DISCONTINUED | OUTPATIENT
Start: 2021-06-18 | End: 2021-06-18 | Stop reason: SURG

## 2021-06-18 RX ORDER — EPHEDRINE SULFATE 50 MG/ML
INJECTION, SOLUTION INTRAVENOUS AS NEEDED
Status: DISCONTINUED | OUTPATIENT
Start: 2021-06-18 | End: 2021-06-18 | Stop reason: SURG

## 2021-06-18 RX ORDER — ONDANSETRON 2 MG/ML
INJECTION INTRAMUSCULAR; INTRAVENOUS AS NEEDED
Status: DISCONTINUED | OUTPATIENT
Start: 2021-06-18 | End: 2021-06-18 | Stop reason: SURG

## 2021-06-18 RX ORDER — LIDOCAINE HYDROCHLORIDE 10 MG/ML
0.5 INJECTION, SOLUTION EPIDURAL; INFILTRATION; INTRACAUDAL; PERINEURAL ONCE AS NEEDED
Status: COMPLETED | OUTPATIENT
Start: 2021-06-18 | End: 2021-06-18

## 2021-06-18 RX ORDER — NEOSTIGMINE METHYLSULFATE 1 MG/ML
INJECTION, SOLUTION INTRAVENOUS AS NEEDED
Status: DISCONTINUED | OUTPATIENT
Start: 2021-06-18 | End: 2021-06-18 | Stop reason: SURG

## 2021-06-18 RX ORDER — IBUPROFEN 800 MG/1
800 TABLET ORAL EVERY 8 HOURS PRN
Qty: 30 TABLET | Refills: 0 | Status: SHIPPED | OUTPATIENT
Start: 2021-06-18 | End: 2021-07-08

## 2021-06-18 RX ORDER — DEXAMETHASONE SODIUM PHOSPHATE 10 MG/ML
INJECTION INTRAMUSCULAR; INTRAVENOUS AS NEEDED
Status: DISCONTINUED | OUTPATIENT
Start: 2021-06-18 | End: 2021-06-18 | Stop reason: SURG

## 2021-06-18 RX ADMIN — HYDROMORPHONE HYDROCHLORIDE 0.5 MG: 1 INJECTION, SOLUTION INTRAMUSCULAR; INTRAVENOUS; SUBCUTANEOUS at 18:00

## 2021-06-18 RX ADMIN — DEXAMETHASONE SODIUM PHOSPHATE 8 MG: 10 INJECTION INTRAMUSCULAR; INTRAVENOUS at 14:58

## 2021-06-18 RX ADMIN — FENTANYL CITRATE 25 MCG: 50 INJECTION, SOLUTION INTRAMUSCULAR; INTRAVENOUS at 11:01

## 2021-06-18 RX ADMIN — FENTANYL CITRATE 50 MCG: 50 INJECTION INTRAMUSCULAR; INTRAVENOUS at 17:00

## 2021-06-18 RX ADMIN — GLYCOPYRROLATE 0.4 MG: 0.4 INJECTION INTRAMUSCULAR; INTRAVENOUS at 16:29

## 2021-06-18 RX ADMIN — ROCURONIUM BROMIDE 10 MG: 10 INJECTION, SOLUTION INTRAVENOUS at 15:59

## 2021-06-18 RX ADMIN — LIDOCAINE HYDROCHLORIDE 100 MG: 10 INJECTION, SOLUTION EPIDURAL; INFILTRATION; INTRACAUDAL; PERINEURAL at 14:47

## 2021-06-18 RX ADMIN — OXYCODONE 5 MG: 5 TABLET ORAL at 17:35

## 2021-06-18 RX ADMIN — ALBUTEROL SULFATE 4 PUFF: 90 AEROSOL, METERED RESPIRATORY (INHALATION) at 15:46

## 2021-06-18 RX ADMIN — EPHEDRINE SULFATE 10 MG: 50 INJECTION INTRAVENOUS at 15:01

## 2021-06-18 RX ADMIN — FENTANYL CITRATE 50 MCG: 50 INJECTION INTRAMUSCULAR; INTRAVENOUS at 17:10

## 2021-06-18 RX ADMIN — ACETAMINOPHEN 1000 MG: 500 TABLET, FILM COATED ORAL at 08:13

## 2021-06-18 RX ADMIN — NEOSTIGMINE 3 MG: 1 INJECTION INTRAVENOUS at 16:29

## 2021-06-18 RX ADMIN — SODIUM CHLORIDE, POTASSIUM CHLORIDE, SODIUM LACTATE AND CALCIUM CHLORIDE 9 ML/HR: 600; 310; 30; 20 INJECTION, SOLUTION INTRAVENOUS at 08:02

## 2021-06-18 RX ADMIN — HYDROMORPHONE HYDROCHLORIDE 0.5 MG: 1 INJECTION, SOLUTION INTRAMUSCULAR; INTRAVENOUS; SUBCUTANEOUS at 17:18

## 2021-06-18 RX ADMIN — ONDANSETRON 4 MG: 2 INJECTION INTRAMUSCULAR; INTRAVENOUS at 14:58

## 2021-06-18 RX ADMIN — CEFAZOLIN SODIUM 3 G: 10 INJECTION, POWDER, FOR SOLUTION INTRAVENOUS at 14:49

## 2021-06-18 RX ADMIN — HYDROMORPHONE HYDROCHLORIDE 0.5 MG: 1 INJECTION, SOLUTION INTRAMUSCULAR; INTRAVENOUS; SUBCUTANEOUS at 17:30

## 2021-06-18 RX ADMIN — SODIUM CHLORIDE, POTASSIUM CHLORIDE, SODIUM LACTATE AND CALCIUM CHLORIDE: 600; 310; 30; 20 INJECTION, SOLUTION INTRAVENOUS at 15:00

## 2021-06-18 RX ADMIN — PROPOFOL 300 MG: 10 INJECTION, EMULSION INTRAVENOUS at 14:47

## 2021-06-18 RX ADMIN — ROCURONIUM BROMIDE 20 MG: 10 INJECTION, SOLUTION INTRAVENOUS at 15:32

## 2021-06-18 RX ADMIN — LIDOCAINE HYDROCHLORIDE 0.5 ML: 10 INJECTION, SOLUTION EPIDURAL; INFILTRATION; INTRACAUDAL; PERINEURAL at 08:02

## 2021-06-18 RX ADMIN — SODIUM CHLORIDE, POTASSIUM CHLORIDE, SODIUM LACTATE AND CALCIUM CHLORIDE: 600; 310; 30; 20 INJECTION, SOLUTION INTRAVENOUS at 16:28

## 2021-06-18 RX ADMIN — HEPARIN SODIUM 5000 UNITS: 5000 INJECTION, SOLUTION INTRAVENOUS; SUBCUTANEOUS at 08:13

## 2021-06-18 RX ADMIN — FAMOTIDINE 20 MG: 20 TABLET ORAL at 08:13

## 2021-06-18 RX ADMIN — ROCURONIUM BROMIDE 50 MG: 10 INJECTION, SOLUTION INTRAVENOUS at 14:47

## 2021-06-18 RX ADMIN — FENTANYL CITRATE 100 MCG: 50 INJECTION, SOLUTION INTRAMUSCULAR; INTRAVENOUS at 14:47

## 2021-06-18 RX ADMIN — ESMOLOL HYDROCHLORIDE 30 MG: 10 INJECTION, SOLUTION INTRAVENOUS at 15:35

## 2021-06-18 NOTE — ANESTHESIA PREPROCEDURE EVALUATION
Anesthesia Evaluation     Patient summary reviewed and Nursing notes reviewed   no history of anesthetic complications:  NPO Solid Status: > 8 hours  NPO Liquid Status: > 2 hours           Airway   Mallampati: III  TM distance: >3 FB  Neck ROM: full  Possible difficult intubation  Dental - normal exam     Pulmonary    (+) sleep apnea, decreased breath sounds,   Cardiovascular   Exercise tolerance: poor (<4 METS)    Rhythm: regular  Rate: normal        Neuro/Psych  GI/Hepatic/Renal/Endo    (+) morbid obesity,  liver disease fatty liver disease, diabetes mellitus type 2 poorly controlled,     Musculoskeletal     Abdominal   (+) obese,     Abdomen: soft.   Substance History      OB/GYN          Other   arthritis, autoimmune disease lupus and Sjogren syndrome,    history of cancer active                    Anesthesia Plan    ASA 3     general     intravenous induction     Anesthetic plan, all risks, benefits, and alternatives have been provided, discussed and informed consent has been obtained with: patient.    Plan discussed with CRNA.

## 2021-06-18 NOTE — ANESTHESIA POSTPROCEDURE EVALUATION
Patient: Cristino Trevino    Procedure Summary     Date: 06/18/21 Room / Location:  [facility] / [facility]    Anesthesia Start: 1439 Anesthesia Stop:     Procedure: TOTAL LAPAROSCOPIC HYSTERECTOMY BILATERAL SALPINGOOPHORECTOMY WITH DAVINCI ROBOT, INJECTION FOR SENTINEL LYMPH NODES (N/A Abdomen) Diagnosis:       Complex atypical endometrial hyperplasia      (Complex atypical endometrial hyperplasia [N85.02])    Surgeons: Ama Morris MD Provider: Rob Teresa MD    Anesthesia Type: general ASA Status: 3          Anesthesia Type: general    Vitals  Vitals Value Taken Time   /99 06/18/21 1642   Temp     Pulse 108 06/18/21 1643   Resp     SpO2 90 % 06/18/21 1643   Vitals shown include unvalidated device data.        Post Anesthesia Care and Evaluation    Patient location during evaluation: PACU  Patient participation: complete - patient participated  Level of consciousness: awake and alert  Pain management: adequate  Airway patency: patent  Anesthetic complications: No anesthetic complications  PONV Status: none  Cardiovascular status: hemodynamically stable and acceptable  Respiratory status: nonlabored ventilation, acceptable and nasal cannula  Hydration status: acceptable

## 2021-06-18 NOTE — INTERVAL H&P NOTE
"[facility] Pre-op    Full history and physical note from office is attached.    /95 (BP Location: Right arm, Patient Position: Lying)   Pulse 90   Temp 97.5 °F (36.4 °C) (Temporal)   Resp 16   Ht 167.6 cm (66\")   Wt (!) 137 kg (303 lb)   SpO2 97%   BMI 48.91 kg/m²     Immunizations:  Influenza:  No  Pneumococcal:  No  Tetanus:  No  Covid : No    LAB Results:  Lab Results   Component Value Date    WBC 12.34 (H) 06/16/2021    HGB 13.6 06/16/2021    HCT 45.9 06/16/2021    MCV 86.6 06/16/2021     06/16/2021    NEUTROABS 8.61 (H) 06/16/2021    GLUCOSE 177 (H) 06/16/2021    BUN 16 06/16/2021    CREATININE 0.64 06/16/2021    EGFRIFNONA 94 06/16/2021     06/16/2021    K 3.8 06/16/2021     06/16/2021    CO2 27.0 06/16/2021    CALCIUM 9.5 06/16/2021    ALBUMIN 4.10 06/16/2021     (H) 06/16/2021     (H) 06/16/2021    BILITOT 1.1 06/16/2021       Cancer Staging (if applicable)  Cancer Patient: __ yes __no __unknown__N/A; If yes, clinical stage T:__ N:__M:__, stage group or __N/A      Impression: complex atypical endometrial hyperplasia       Plan: TOTAL LAPAROSCOPIC HYSTERECTOMY BILATERAL SALPINGOOPHORECTOMY WITH DAVINCI ROBOT, SENTINEL LYMPH NODE DISSECTION, POSSIBLE COMPLETION LYMPHADENECTOMY      TUTU Shoemaker   6/18/2021   08:31 EDT   "

## 2021-06-18 NOTE — OP NOTE
Operative Note     Patient Name: Cristino Trevino  : 1960   MRN: [id number]   Date: 21  Time: 18:44 EDT    Date of Service:  21  Time of Service:  18:44 EDT    Surgical Staff: Surgeon(s) and Role:     * Ama Morris MD - Primary     * Hannah Dinh MD - Resident - Assisting  Jacklyn Galdamez PA - Assistant   Additional Staff: MERCY Fuller was responsible for performing the following activities: Retraction, Suction, Irrigation, Suturing, Closing, Placing Dressing and Held/Positioned Camera and their skilled assistance was necessary for the success of this case.         Pre-operative diagnosis(es): Pre-Op Diagnosis Codes:     * Complex atypical endometrial hyperplasia [N85.02]  Morbid obesity BMI > 50   Post-operative diagnosis(es): Post-Op Diagnosis Codes:     * Complex atypical endometrial hyperplasia [N85.02]  Morbid obesity BMI > 50   Procedure(s): Procedure(s):  TOTAL LAPAROSCOPIC HYSTERECTOMY BILATERAL SALPINGOOPHORECTOMY WITH DAVINCI ROBOT, INJECTION FOR SENTINEL LYMPH NODES     Antibiotics: cefazolin (Ancef) ordered on call to OR     Anesthesia: Type: General  ASA:  III     Objective      Operative findings: Normal appearing cervix. Uterus sounded to 7 cm. Normal-appearing bilateral adnexa. No evidence of extrauterine disease. Bilateral sentinel lymph node mapping to the obturator spaces. Upper abdominal survey normal.   Specimens removed: ID Type Source Tests Collected by Time   A (Not marked as sent) :  Tissue Uterus with Cervix, Bilateral Tubes and Ovaries TISSUE PATHOLOGY EXAM Ama Morris MD 2021 1603      Fluid Intake and Output: No intake/output data recorded.   Blood products used: No   Drains: Urethral Catheter Silicone;Non-latex 16 Fr. (Active)      Implant Information: Nothing was implanted during the procedure   Complications: None   Intraoperative consult(s):    Condition: stable   Disposition: to PACU and then discharge home versus admit to floor      INDICATIONS FOR PROCEDURE:  Cristino Trevino is a 61 y.o. female with a history of postmenopausal bleeding and uterine sampling demonstrating complex atypical hyperplasia. She was counseled regarding options and she desired to proceed with hysterectomy, BSO and staging by a robotic approach. Questions were answered and consent was signed.      DESCRIPTION OF PROCEDURE:  The patient was taken to the operating room after the sites had been marked. After a time out procedure was performed, and the planned procedure and patient were confirmed, she was placed under general anesthesia with endotracheal intubation  She received prophylactic antibiotics prior to skin incision.  She was placed in the dorsal lithotomy position in the [facility] and prepared and draped in normal sterile fashion. A driver catheter was placed sterilely.     A supraumbilical incision was then made with a scalpel.The peritoneal cavity was entered with the Veress needle. Correct placement of the Veress needle into the peritoneal cavity was confirmed by a drop in pressure. The abdomen was insufflated to a pressure of 15 mmHg. An 8-mm endoscope trocar was then placed through the incision with the findings as noted. Three 8-mm trocars were placed in the left and right upper quadrants. A 8-mm endoscopic trocar was placed in the right upper quadrant.     Prior to beginning the hysterectomy, a speculum was placed into the vagina and ICG dye injected into the cervix. A Leana manipulator (7 cm tip and 3 cm cup) with pneumo-occluder was placed in the vagina.     Attention was first turned turned to the pelvic sentinel lymphadenectomy.  The sigmoid colon was adherent to the left pelvic sidewall and adnexa.  Adhesions were lysed using a combination of blunt and sharp dissection.  The retroperitoneum was opened bilaterally and the pararectal and paravesical spaces were developed.  On both sides she was noted to have lymph node mapping to the obturator  spaces.  However at this time patient began experiencing desaturations to the mid 80s requiring us to decrease our Trendelenburg to 24 degrees and are insufflation to 13 mmHg.  This significantly impeded our visualization of the bilateral sidewalls.  I therefore elected to omit lymph node dissection given that the patient did not have a preoperative cancer diagnosis for patient safety due to difficulty with visualization as well as my attention to decrease OR time.    The round ligament was grasped, and the peritoneum over the sidewall was opened sharply.  The retroperitoneal space was then further developed, and the ureter was identified.  The peritoneum between the ureter and the infundibulopelvic ligament was then incised and a window was created.  The ovarian vessels were then thoroughly coagulated and transected.  The round ligament was then transected, and the vesicouterine peritoneum was further skeletonized to the midline. Similar steps were performed on the left side where also the retroperitoneum was opened, the ureter was visualized, and a window was then created between the ovarian vessels and the ureter prior to coagulating and transecting the ovarian vessels. The vesicouterine peritoneum was then further skeletonized, and the vaginal manipulator was further inserted to identify the vagina and to allow dissection of the bladder off of the vagina. Next the uterine vessels were carefully skeletonized before being coagulated and transected.  Subsequent pedicles were created closely along the cervix of the cardinal ligament and uterosacral ligaments. A colpotomy was then made until the uterus was fully removed from the vagina and the uterus handed off for permanent pathology. The vaginal cuff was closed with figure of eight 0-Vicryls.     We again inspected all areas, and they all were found to be hemostatic, and no instruments, needles or sponges were left in the abdomen. The bladder was backfilled with  120 cc of sterile saline and the driver catheter removed. The robotic instruments were removed and the robot undocked. Skin incisions were closed with 4-0 monocryl in a subcuticular fashion, and skin adhesive was placed over these incisions. Sponge, lap and needle counts were correct x2.  The patient was then woken up from anesthesia and taken to PACU in stable condition.

## 2021-06-18 NOTE — TELEPHONE ENCOUNTER
RN called and left voicemail that surgery had been authorized and asked that she call or message on [employer] that she is aware.

## 2021-06-18 NOTE — ANESTHESIA PROCEDURE NOTES
Airway  Urgency: elective    Date/Time: 6/18/2021 2:52 PM  Airway not difficult    General Information and Staff    Patient location during procedure: OR  Anesthesiologist: Rob Teresa MD  CRNA: Debi Worley CRNA    Indications and Patient Condition  Indications for airway management: airway protection    Preoxygenated: yes  MILS maintained throughout  Mask difficulty assessment: 1 - vent by mask    Final Airway Details  Final airway type: endotracheal airway      Successful airway: ETT  Cuffed: yes   Successful intubation technique: direct laryngoscopy  Endotracheal tube insertion site: oral  Blade: Kitty  Blade size: 3  ETT size (mm): 7.0  Cormack-Lehane Classification: grade IIa - partial view of glottis  Placement verified by: chest auscultation and capnometry   Measured from: lips  ETT/EBT  to lips (cm): 20  Number of attempts at approach: 1  Assessment: lips, teeth, and gum same as pre-op and atraumatic intubation

## 2021-06-22 LAB
CYTO UR: NORMAL
LAB AP CASE REPORT: NORMAL
LAB AP CLINICAL INFORMATION: NORMAL
PATH REPORT.FINAL DX SPEC: NORMAL
PATH REPORT.GROSS SPEC: NORMAL

## 2021-06-24 ENCOUNTER — TELEPHONE (OUTPATIENT)
Dept: GYNECOLOGIC ONCOLOGY | Facility: CLINIC | Age: 61
End: 2021-06-24

## 2021-06-24 NOTE — TELEPHONE ENCOUNTER
Called pt to notify there was no cancer in her pathology and I also reminded her of her post op appt. Pt v/u.

## 2021-06-24 NOTE — TELEPHONE ENCOUNTER
----- Message from Ama Morris MD sent at 6/23/2021  5:23 PM EDT -----  Regarding: FW:   Please let Cristino know that there was no cancer on her pathology. Thanks.  ----- Message -----  From: Lab, Background User  Sent: 6/22/2021   2:40 PM CDT  To: Ama Morris MD

## 2021-07-08 ENCOUNTER — OFFICE VISIT (OUTPATIENT)
Dept: GYNECOLOGIC ONCOLOGY | Facility: CLINIC | Age: 61
End: 2021-07-08

## 2021-07-08 VITALS
DIASTOLIC BLOOD PRESSURE: 83 MMHG | HEART RATE: 99 BPM | SYSTOLIC BLOOD PRESSURE: 138 MMHG | RESPIRATION RATE: 20 BRPM | HEIGHT: 66 IN | BODY MASS INDEX: 48.93 KG/M2 | TEMPERATURE: 98 F | OXYGEN SATURATION: 98 %

## 2021-07-08 DIAGNOSIS — N85.02 COMPLEX ATYPICAL ENDOMETRIAL HYPERPLASIA: Primary | ICD-10-CM

## 2021-07-08 PROCEDURE — 99024 POSTOP FOLLOW-UP VISIT: CPT | Performed by: OBSTETRICS & GYNECOLOGY

## 2021-07-08 NOTE — PATIENT INSTRUCTIONS
Kegel Exercises    Kegel exercises can help strengthen your pelvic floor muscles. The pelvic floor is a group of muscles that support your rectum, small intestine, and bladder. In females, pelvic floor muscles also help support the womb (uterus). These muscles help you control the flow of urine and stool.  Kegel exercises are painless and simple, and they do not require any equipment. Your provider may suggest Kegel exercises to:  · Improve bladder and bowel control.  · Improve sexual response.  · Improve weak pelvic floor muscles after surgery to remove the uterus (hysterectomy) or pregnancy (females).  · Improve weak pelvic floor muscles after prostate gland removal or surgery (males).  Kegel exercises involve squeezing your pelvic floor muscles, which are the same muscles you squeeze when you try to stop the flow of urine or keep from passing gas. The exercises can be done while sitting, standing, or lying down, but it is best to vary your position.  Exercises  How to do Kegel exercises:  1. Squeeze your pelvic floor muscles tight. You should feel a tight lift in your rectal area. If you are a female, you should also feel a tightness in your vaginal area. Keep your stomach, buttocks, and legs relaxed.  2. Hold the muscles tight for up to 10 seconds.  3. Breathe normally.  4. Relax your muscles.  5. Repeat as told by your health care provider.  Repeat this exercise daily as told by your health care provider. Continue to do this exercise for at least 4-6 weeks, or for as long as told by your health care provider.  You may be referred to a physical therapist who can help you learn more about how to do Kegel exercises.  Depending on your condition, your health care provider may recommend:  · Varying how long you squeeze your muscles.  · Doing several sets of exercises every day.  · Doing exercises for several weeks.  · Making Kegel exercises a part of your regular exercise routine.  This information is not intended  to replace advice given to you by your health care provider. Make sure you discuss any questions you have with your health care provider.  Document Revised: 04/23/2021 Document Reviewed: 08/07/2019  [employer]vier Patient Education © 2021 [employer]

## 2021-07-09 NOTE — PROGRESS NOTES
"     Post Operative Office Visit      Patient Name: Cristino Trevino  : 1960   MRN: [id number]     Chief Complaint:  Postoperative visit    History of Present Illness: Cristino Trevino is a 61 y.o. female who is status post robotic assisted total laparoscopic hysterectomy bilateral salpingo-oophorectomy inject for sentinel lymph node dissection on 2021 for complex atypical hyperplasia. Her post operative course has been unremarkable and continues to be recovering well. She is tolerating a normal diet. She reports normal return of bowel function. She states her pain is well controlled.     Medical, surgical, and family history updated as needed.  Subjective      Review of Systems:   Review of Systems   Constitutional: Negative for chills, fatigue and fever.   Respiratory: Negative for cough, shortness of breath and wheezing.    Cardiovascular: Negative for chest pain, palpitations and leg swelling.   Gastrointestinal: Negative for abdominal pain, constipation, diarrhea, nausea and vomiting.   Genitourinary: Negative for dysuria, pelvic pain, urgency, vaginal bleeding, vaginal discharge and vaginal pain.   Neurological: Negative for dizziness and light-headedness.        Objective     Physical Exam:  Vital Signs:   Vitals:    21 1319   BP: 138/83   Pulse: 99   Resp: 20   Temp: 98 °F (36.7 °C)   TempSrc: Temporal   SpO2: 98%   Height: 167.6 cm (65.98\")   PainSc: 0-No pain     BMI: Body mass index is 48.93 kg/m².     ECOG performance status 1    Physical Exam  Vitals and nursing note reviewed. Exam conducted with a chaperone present.   Constitutional:       General: She is not in acute distress.     Appearance: Normal appearance. She is well-developed. She is not diaphoretic.   HENT:      Head: Normocephalic and atraumatic.      Right Ear: External ear normal.      Left Ear: External ear normal.      Nose: Nose normal.   Eyes:      General: No scleral icterus.        Right eye: No discharge.         " Left eye: No discharge.      Conjunctiva/sclera: Conjunctivae normal.   Neck:      Thyroid: No thyromegaly.   Cardiovascular:      Rate and Rhythm: Normal rate.   Pulmonary:      Effort: Pulmonary effort is normal. No respiratory distress.   Abdominal:      General: There is no distension.      Palpations: Abdomen is soft. There is no mass.      Tenderness: There is no abdominal tenderness. There is no guarding.      Comments: L/S incisions with some bruising. Skin glue in place. C/D/I   Genitourinary:     Comments: External genitalia normal. Vagina without discharge. Vaginal cuff intact. Uterus, cervix and adnexa surgically absent. Rectovaginal exam deferred.  Musculoskeletal:         General: No swelling, tenderness, deformity or signs of injury.      Cervical back: Neck supple.      Right lower leg: No edema.      Left lower leg: No edema.   Lymphadenopathy:      Cervical: No cervical adenopathy.   Skin:     General: Skin is warm and dry.      Coloration: Skin is not jaundiced.      Findings: No erythema, lesion or rash.   Neurological:      General: No focal deficit present.      Mental Status: She is alert and oriented to person, place, and time. Mental status is at baseline.      Motor: No abnormal muscle tone.   Psychiatric:         Behavior: Behavior normal.         Thought Content: Thought content normal.         Medications:     Current Outpatient Medications:   •  metFORMIN (GLUCOPHAGE) 500 MG tablet, Take 500 mg by mouth 2 (Two) Times a Day With Meals., Disp: , Rfl:   •  acetaminophen (Tylenol) 325 MG tablet, Take 2 tablets by mouth Every 6 (Six) Hours As Needed for Mild Pain ., Disp: 60 tablet, Rfl: 0  •  fexofenadine (ALLEGRA) 180 MG tablet, Take 180 mg by mouth As Needed., Disp: , Rfl:   •  hydroCHLOROthiazide (HYDRODIURIL) 25 MG tablet, Take 25 mg by mouth Daily. Prescribed for swelling, Disp: , Rfl:   •  ibuprofen (ADVIL,MOTRIN) 200 MG tablet, Take 400 mg by mouth Every 6 (Six) Hours As Needed for  Mild Pain ., Disp: , Rfl:   •  levothyroxine (SYNTHROID, LEVOTHROID) 175 MCG tablet, Take 175 mcg by mouth Every Morning., Disp: , Rfl:   Current outpatient and discharge medications have been reconciled for the patient.  Reviewed by: Ama Morris MD      Allergies:   No Known Allergies    Pathology:   UTERUS, BILATERAL FALLOPIAN TUBES AND OVARIES, HYSTERECTOMY AND SALPINGO-OOPHORECTOMY:                 Endometrium with atypical complex hyperplasia                 Cervix with mixed inflammation and squamous metaplasia                 1 leiomyoma                 Benign ovaries with physiological changes                 Unremarkable fallopian tubes                 Negative for invasive malignancy    Operative findings again reviewed. Pathology report discussed.       Assessment / Plan    Cristino Trevino is s/p  robotic assisted total laparoscopic hysterectomy bilateral salpingo-oophorectomy inject for sentinel lymph node dissection on 6/8/2021 for complex atypical hyperplasia and is recovering well from her surgery. Her final pathology revealed complex atypical hyperplasia which will require no further therapy. She was reminded that though she no longer requires Pap tests, she should continue recommended cancer screening guidelines, assessment for osteoporosis, including the use of Vitamin D and calcium, and maintain a healthy lifestyle. She may continue her annual examinations/follow-up locally; however, we will be readily available if needed, particularly during the post-operative course.    Assessment/Plan:   Diagnoses and all orders for this visit:    1. Complex atypical endometrial hyperplasia (Primary)         Follow Up:   No follow-ups on file.    RONY Morris MD  Gynecologic Oncology     Please note that portions of this note may have been completed with a voice recognition program.

## 2021-08-06 ENCOUNTER — TELEPHONE (OUTPATIENT)
Dept: GYNECOLOGIC ONCOLOGY | Facility: CLINIC | Age: 61
End: 2021-08-06

## 2021-08-06 NOTE — TELEPHONE ENCOUNTER
RN called and spoke with pt and she stated that she is 7 weeks out from surgery (6/18 surg date) and that she had bleeding off and on before but now that she should be healed she was worried.  She stated that she has not increased her activity at all, hasn't lifted anything heavy or had intercourse.  She is not having any pain.      RN spoke with Dr. Huffman who stated that she can come in for a visit next week w/ Dr. Morris if she wants, that it may be some granulation tissue and can f/u next week.    RN relayed information above and pt stated she would call us if the bleeding didn't stop.  RN gave additional bleeding precautions and pt v/u.

## 2021-08-06 NOTE — TELEPHONE ENCOUNTER
Patient states she had surgery 3 weeks ago and is having vaginal bleeding today. She states she has been bleeding all day. Each time she wipes, she states there is about 1/2 tsp on tissue. Also was started on Metformin for elevated blood sugar and had a severe reaction. Please advise

## (undated) DEVICE — SUT MNCRYL PLS ANTIB UD 3/0 PS2 27IN

## (undated) DEVICE — ADHS SKIN PREMIERPRO EXOFIN TOPICAL HI/VISC .5ML

## (undated) DEVICE — PK MAJ GYN DAVINCI 10

## (undated) DEVICE — KT POSTN TRENDELENBURG NBXL PAD WING STRAP TABL HDRST XLG

## (undated) DEVICE — ENDOPATH PNEUMONEEDLE INSUFFLATION NEEDLES WITH LUER LOCK CONNECTORS 120MM: Brand: ENDOPATH

## (undated) DEVICE — ANTIBACTERIAL UNDYED BRAIDED (POLYGLACTIN 910), SYNTHETIC ABSORBABLE SURGICAL SUTURE: Brand: COATED VICRYL

## (undated) DEVICE — COLUMN DRAPE

## (undated) DEVICE — BLADELESS OBTURATOR: Brand: WECK VISTA

## (undated) DEVICE — SHEET, DRAPE, SPLIT, STERILE: Brand: MEDLINE

## (undated) DEVICE — PATIENT RETURN ELECTRODE, SINGLE-USE, CONTACT QUALITY MONITORING, ADULT, WITH 9FT CORD, FOR PATIENTS WEIGING OVER 33LBS. (15KG): Brand: MEGADYNE

## (undated) DEVICE — ANTIBACTERIAL UNDYED BRAIDED (POLYGLACTIN 910), SYNTHETIC ABSORBABLE SUTURE: Brand: COATED VICRYL

## (undated) DEVICE — TIP COVER ACCESSORY

## (undated) DEVICE — GLV SURG SENSICARE W/ALOE PF LF 6.5 STRL

## (undated) DEVICE — MANIP UTER RUMI TP 6.7MM 10CM GRN

## (undated) DEVICE — MANIP UTER RUMI 2 KOH EFFICIENT 3CM BL

## (undated) DEVICE — CANNULA SEAL

## (undated) DEVICE — UNDERGLV SURG BIOGEL INDICAT PF 61/2 GRN

## (undated) DEVICE — SYR LUERLOK 5CC

## (undated) DEVICE — ARM DRAPE

## (undated) DEVICE — ST TBG CONN PNEUMOCLEAR EVAC SMOKE HEAT/HUMID

## (undated) DEVICE — BLANKT WARM UPPR/BDY ARM/OUT 57X196CM

## (undated) DEVICE — APPL CHLORAPREP TINTED 26ML TEAL